# Patient Record
Sex: MALE | Race: BLACK OR AFRICAN AMERICAN | NOT HISPANIC OR LATINO | Employment: UNEMPLOYED | URBAN - METROPOLITAN AREA
[De-identification: names, ages, dates, MRNs, and addresses within clinical notes are randomized per-mention and may not be internally consistent; named-entity substitution may affect disease eponyms.]

---

## 2023-11-08 ENCOUNTER — APPOINTMENT (OUTPATIENT)
Dept: RADIOLOGY | Facility: CLINIC | Age: 23
End: 2023-11-08
Attending: EMERGENCY MEDICINE
Payer: COMMERCIAL

## 2023-11-08 ENCOUNTER — HOSPITAL ENCOUNTER (EMERGENCY)
Facility: CLINIC | Age: 23
Discharge: HOME | End: 2023-11-08
Attending: EMERGENCY MEDICINE | Admitting: EMERGENCY MEDICINE
Payer: COMMERCIAL

## 2023-11-08 VITALS
HEART RATE: 87 BPM | SYSTOLIC BLOOD PRESSURE: 131 MMHG | HEIGHT: 71 IN | OXYGEN SATURATION: 98 % | RESPIRATION RATE: 18 BRPM | BODY MASS INDEX: 22.26 KG/M2 | DIASTOLIC BLOOD PRESSURE: 74 MMHG | TEMPERATURE: 98.7 F | WEIGHT: 159 LBS

## 2023-11-08 DIAGNOSIS — R07.89 RIGHT-SIDED CHEST WALL PAIN: ICD-10-CM

## 2023-11-08 PROCEDURE — 71046 X-RAY EXAM CHEST 2 VIEWS: CPT

## 2023-11-08 PROCEDURE — 99283 EMERGENCY DEPT VISIT LOW MDM: CPT | Mod: 25

## 2023-11-08 PROCEDURE — 250N000013 HC RX MED GY IP 250 OP 250 PS 637: Performed by: EMERGENCY MEDICINE

## 2023-11-08 RX ORDER — IBUPROFEN 800 MG/1
800 TABLET, FILM COATED ORAL EVERY 8 HOURS PRN
Qty: 20 TABLET | Refills: 0 | Status: SHIPPED | OUTPATIENT
Start: 2023-11-08 | End: 2023-11-16

## 2023-11-08 RX ORDER — IBUPROFEN 600 MG/1
600 TABLET, FILM COATED ORAL ONCE
Status: COMPLETED | OUTPATIENT
Start: 2023-11-08 | End: 2023-11-08

## 2023-11-08 RX ORDER — METHOCARBAMOL 500 MG/1
500 TABLET, FILM COATED ORAL 3 TIMES DAILY PRN
Qty: 20 TABLET | Refills: 0 | Status: SHIPPED | OUTPATIENT
Start: 2023-11-08

## 2023-11-08 RX ADMIN — IBUPROFEN 600 MG: 600 TABLET ORAL at 20:06

## 2023-11-09 NOTE — ED PROVIDER NOTES
EMERGENCY DEPARTMENT ENCOUNTER      NAME: Mojgan Nielsen  AGE: 22 year old male  YOB: 2000  MRN: [id number]  EVALUATION DATE & TIME: No admission date for patient encounter.    PCP: No Ref-Primary, Physician    ED PROVIDER: Yovana Canales DO      Chief Complaint   Patient presents with    Chest Wall Pain    Back Pain         FINAL IMPRESSION:  1. Right-sided chest wall pain          ED COURSE & MEDICAL DECISION MAKIN-year-old male presented to the ED for evaluation of right sided chest wall and back pain that been ongoing for the last 1 to 2 days and are worsened with any movement of his torso.  The patient had not taken anything for his symptoms prior to ED arrival.  The patient was hemodynamically stable here in the ED.  He did not appear to be in any obvious distress or discomfort at the time of his initial evaluation.  On exam the patient's pain was clearly reproducible to palpation over the anterolateral chest wall and upper back on the right side.  The remainder of his physical exam was unremarkable.    The patient was given a dose of ibuprofen to treat his symptoms prior to my evaluation.  Patient notes that the pain had improved with the ibuprofen.  The chest x-ray was nondiagnostic.    The patient was informed of the chest x-ray results and reassured.  The patient was informed that his symptoms likely represent a musculoskeletal etiology such as a muscle strain and/or spasm.  After educating and reassure the patient he felt comfortable returning home.  The patient was sent home with ibuprofen and Robaxin to use as needed.  The patient was instructed to follow-up with his primary care provider for reevaluation or to return back to ED sooner for any worsening pain or other new or concerning symptoms.    Pertinent Labs & Imaging studies reviewed. (See chart for details)  8:45 PM I met with the patient to gather history and to perform my initial exam. We discussed plans for the ED course,  including diagnostic testing and treatment. We discussed the plan for discharge and the patient is agreeable. Reviewed supportive cares, symptomatic treatment, outpatient follow up, and reasons to return to the Emergency Department. Patient to be discharged by ED RN.        At the conclusion of the encounter I discussed the results of all of the tests and the disposition. The questions were answered. The patient or family acknowledged understanding and was agreeable with the care plan.     Medical Decision Making    History:  Supplemental history from: N/A  External Record(s) reviewed: Documented in chart, if applicable.    Work Up:  Chart documentation includes differential considered and any EKGs or imaging independently interpreted by provider, where specified.  In additional to work up documented, I considered the following work up: Documented in chart, if applicable.    External consultation:  Discussion of management with another provider: Documented in chart, if applicable    Complicating factors:  Care impacted by chronic illness: N/A  Care affected by social determinants of health: N/A    Disposition considerations: Discharge. I prescribed additional prescription strength medication(s) as charted. See documentation for any additional details.    PPE worn: n95 mask, goggles    MEDICATIONS GIVEN IN THE EMERGENCY:  Medications   ibuprofen (ADVIL/MOTRIN) tablet 600 mg (600 mg Oral $Given 11/8/23 2006)       NEW PRESCRIPTIONS STARTED AT TODAY'S ER VISIT  Discharge Medication List as of 11/8/2023  8:55 PM        START taking these medications    Details   ibuprofen (ADVIL/MOTRIN) 800 MG tablet Take 1 tablet (800 mg) by mouth every 8 hours as needed for moderate pain, Disp-20 tablet, R-0, Local Print      methocarbamol (ROBAXIN) 500 MG tablet Take 1 tablet (500 mg) by mouth 3 times daily as needed for muscle spasms, Disp-20 tablet, R-0, Local Print             "    =================================================================    HPI    Patient information was obtained from: Patient    Use of : N/A        Mojgan Nielsen is a 22 year old male with no pertinent history who presents to this ED via walk-in for evaluation of chest and back pain.    Patient reports right chest and back of right shoulder pain that started when he woke up this morning. He notes reaching into a fridge at 3 PM yesterday. Patient says pain is \"like something is hitting me inside my chest\" and it worsens with deep breaths and when reaching down. Otherwise, patient denies any trauma or injury to pain area, or taking any pain medications before coming to ED. No other complaints at this time.       REVIEW OF SYSTEMS   Review of Systems   Cardiovascular:  Positive for chest pain (right).   Musculoskeletal:         Positive for pain to back of right shoulder   All other systems reviewed and are negative.       PAST MEDICAL HISTORY:  No past medical history on file.    PAST SURGICAL HISTORY:  No past surgical history on file.        CURRENT MEDICATIONS:    ibuprofen (ADVIL/MOTRIN) 800 MG tablet  methocarbamol (ROBAXIN) 500 MG tablet        ALLERGIES:  No Known Allergies    FAMILY HISTORY:  No family history on file.    SOCIAL HISTORY:   Social History     Socioeconomic History    Marital status: Single       VITALS:  /74   Pulse 87   Temp 98.7  F (37.1  C) (Temporal)   Resp 18   Ht 1.803 m (5' 11\")   Wt 72.1 kg (159 lb)   SpO2 98%   BMI 22.18 kg/m      PHYSICAL EXAM    General presentation: Alert, Vital signs reviewed. NAD  HENT: ENT inspection is normal. Oropharynx is moist and clear.   Eye: Pupils are equal and reactive to light. EOMI  Neck: The neck is supple, with full ROM, with no evidence of meningismus.  Pulmonary: Currently in no acute respiratory distress. Normal, non labored respirations, the lung sounds are normal with good equal air movement. Clear to auscultation " bilaterally.   Circulatory: Regular rate and rhythm. Peripheral pulses are strong and equal. No murmurs, rubs, or gallops.   Abdominal: The abdomen is soft. Nontender. No rigidity, guarding, or rebound. Bowel sounds normal.   Neurologic: Alert, oriented to person, place, and time. No motor deficit. No sensory deficit. Cranial nerves II through XII are intact.  Musculoskeletal: No extremity tenderness. Full range of motion in all extremities. No extremity edema. Palpation to right chest wall and right upper back reproduces pain.  Skin: Skin color is normal. No rash. Warm. Dry to touch.      LAB:  All pertinent labs reviewed and interpreted.  Results for orders placed or performed during the hospital encounter of 11/08/23   Chest XR,  PA & LAT    Impression    IMPRESSION: Negative chest.       RADIOLOGY:  Reviewed all pertinent imaging. Please see official radiology report.  Chest XR,  PA & LAT   Final Result   IMPRESSION: Negative chest.          EKG:    None    PROCEDURES:   None      [facility] Documentation:   CMS Diagnoses:               I, Yuki Bobo , am serving as a scribe to document services personally performed by Yovana Canales DO based on my observation and the provider's statements to me. I, Yovana Canales, attest that Yuki Bobo is acting in a scribe capacity, has observed my performance of the services and has documented them in accordance with my direction.    Yovana Canales DO  Emergency Medicine  [facility] EMERGENCY ROOM  [address] [id number] [phone]       Yovana Canales DO  11/09/23 0001

## 2023-11-09 NOTE — DISCHARGE INSTRUCTIONS
Your pain is likely due to muscle spasms.  The chest x-ray appears reassuring here today in the ED.    Take the prescribed ibuprofen and the muscle relaxant as needed for any further pain.      Follow-up with your primary care provider for reevaluation or return back to ED sooner for any worsening pain or any other new or concerning symptoms.

## 2023-11-09 NOTE — ED TRIAGE NOTES
Pt presents to the ED with c/o of right chest wall pain that radiates to the back. Pt reports waking up today and feeling the pain with movement and when he breathes in. Pt reports very painful when bending over to pray.      Triage Assessment (Adult)       Row Name 11/08/23 1501          Triage Assessment    Airway WDL WDL        Respiratory WDL    Respiratory WDL WDL        Skin Circulation/Temperature WDL    Skin Circulation/Temperature WDL WDL        Cardiac WDL    Cardiac WDL WDL        Peripheral/Neurovascular WDL    Peripheral Neurovascular WDL WDL        Cognitive/Neuro/Behavioral WDL    Cognitive/Neuro/Behavioral WDL WDL

## 2025-07-29 ENCOUNTER — OFFICE VISIT (OUTPATIENT)
Dept: FAMILY MEDICINE | Facility: CLINIC | Age: 25
End: 2025-07-29
Payer: COMMERCIAL

## 2025-07-29 VITALS
WEIGHT: 144.1 LBS | OXYGEN SATURATION: 98 % | DIASTOLIC BLOOD PRESSURE: 70 MMHG | RESPIRATION RATE: 16 BRPM | BODY MASS INDEX: 21.34 KG/M2 | SYSTOLIC BLOOD PRESSURE: 114 MMHG | TEMPERATURE: 97.7 F | HEIGHT: 69 IN | HEART RATE: 97 BPM

## 2025-07-29 DIAGNOSIS — Z11.4 SCREENING FOR HIV (HUMAN IMMUNODEFICIENCY VIRUS): ICD-10-CM

## 2025-07-29 DIAGNOSIS — N50.89 MASS OF RIGHT TESTICLE: ICD-10-CM

## 2025-07-29 DIAGNOSIS — Z11.59 NEED FOR HEPATITIS C SCREENING TEST: ICD-10-CM

## 2025-07-29 DIAGNOSIS — Z13.6 SCREENING FOR CARDIOVASCULAR CONDITION: ICD-10-CM

## 2025-07-29 DIAGNOSIS — N50.811 TESTICULAR PAIN, RIGHT: ICD-10-CM

## 2025-07-29 DIAGNOSIS — E55.9 VITAMIN D DEFICIENCY: ICD-10-CM

## 2025-07-29 DIAGNOSIS — Z11.3 SCREENING FOR STDS (SEXUALLY TRANSMITTED DISEASES): ICD-10-CM

## 2025-07-29 DIAGNOSIS — R73.9 HYPERGLYCEMIA: ICD-10-CM

## 2025-07-29 DIAGNOSIS — Z00.00 ROUTINE GENERAL MEDICAL EXAMINATION AT A HEALTH CARE FACILITY: Primary | ICD-10-CM

## 2025-07-29 LAB
ERYTHROCYTE [DISTWIDTH] IN BLOOD BY AUTOMATED COUNT: 12.7 % (ref 10–15)
HCT VFR BLD AUTO: 46.4 % (ref 40–53)
HGB BLD-MCNC: 15.9 G/DL (ref 13.3–17.7)
HOLD SPECIMEN: NORMAL
LDH SERPL L TO P-CCNC: 187 U/L (ref 0–250)
MCH RBC QN AUTO: 30.7 PG (ref 26.5–33)
MCHC RBC AUTO-ENTMCNC: 34.3 G/DL (ref 31.5–36.5)
MCV RBC AUTO: 90 FL (ref 78–100)
PLATELET # BLD AUTO: 204 10E3/UL (ref 150–450)
RBC # BLD AUTO: 5.18 10E6/UL (ref 4.4–5.9)
WBC # BLD AUTO: 4.2 10E3/UL (ref 4–11)

## 2025-07-29 PROCEDURE — 85027 COMPLETE CBC AUTOMATED: CPT

## 2025-07-29 PROCEDURE — 83615 LACTATE (LD) (LDH) ENZYME: CPT

## 2025-07-29 PROCEDURE — G2211 COMPLEX E/M VISIT ADD ON: HCPCS

## 2025-07-29 PROCEDURE — 83036 HEMOGLOBIN GLYCOSYLATED A1C: CPT

## 2025-07-29 PROCEDURE — 86803 HEPATITIS C AB TEST: CPT

## 2025-07-29 PROCEDURE — 82306 VITAMIN D 25 HYDROXY: CPT

## 2025-07-29 PROCEDURE — 82105 ALPHA-FETOPROTEIN SERUM: CPT

## 2025-07-29 PROCEDURE — 87491 CHLMYD TRACH DNA AMP PROBE: CPT

## 2025-07-29 PROCEDURE — 36415 COLL VENOUS BLD VENIPUNCTURE: CPT

## 2025-07-29 PROCEDURE — 87389 HIV-1 AG W/HIV-1&-2 AB AG IA: CPT

## 2025-07-29 PROCEDURE — 99385 PREV VISIT NEW AGE 18-39: CPT

## 2025-07-29 PROCEDURE — 84702 CHORIONIC GONADOTROPIN TEST: CPT

## 2025-07-29 PROCEDURE — 80048 BASIC METABOLIC PNL TOTAL CA: CPT

## 2025-07-29 PROCEDURE — 99213 OFFICE O/P EST LOW 20 MIN: CPT | Mod: 25

## 2025-07-29 PROCEDURE — 87591 N.GONORRHOEAE DNA AMP PROB: CPT

## 2025-07-29 PROCEDURE — 80061 LIPID PANEL: CPT

## 2025-07-29 SDOH — HEALTH STABILITY: PHYSICAL HEALTH: ON AVERAGE, HOW MANY DAYS PER WEEK DO YOU ENGAGE IN MODERATE TO STRENUOUS EXERCISE (LIKE A BRISK WALK)?: 2 DAYS

## 2025-07-29 ASSESSMENT — SOCIAL DETERMINANTS OF HEALTH (SDOH): HOW OFTEN DO YOU GET TOGETHER WITH FRIENDS OR RELATIVES?: ONCE A WEEK

## 2025-07-29 NOTE — PATIENT INSTRUCTIONS
Thank you for seeing us at [facility].    You will get a call to schedule the Ultraosund of your testicles     To Review:  If you are getting lab work today, we will send you a BringMeTheNews message with recommendations once these are all returned to us.  X-rays are able to be performed in clinic and we will notify you of the results.  Any other imaging is scheduled and you will be contacted by the scheduling department.  If you do not hear from them in 2 weeks, please call [phone]   If you are getting immunizations today, you may have some arm soreness; you can use tylenol or ibuprofen for this.  If you are getting referrals you should be called within the next 3 to 5 days.    An E visit is an excellent way to get quick evaluation from myself. These can be completed using the [facility] Kisha or online using BringMeTheNews. We can evaluate a variety of conditions using this including sinusitis, skin conditions, etc. Please send us a BringMeTheNews Message or call if having issues or questions.    Dr. CORY Williamson DO, MS  [facility] Medicine  [phone]   Patient Education   Talada Daryeelka Ka   Hortaga ah  Gigi waa franciscoo courtney oo aan inta badan bixino si aan dadka uga caawino inay caafimaad qabaan. Kooxdaada daryeelka ayaa laga yaabaa inay kuu hayaan belinda branhamu gaar ah. Fadlan todd hadal kooxdaada daryeelka baahiyahaaga daryeelka ee ka hortaga.  Hab Nololeedka  Samee jimvenusi ugu yaraan 150 daqiiqo todobaad kasta (30 daqiiqo maalintii, 5 maalmood todobaadkii).  Samee hawlaha xoojiya murqaha 2 maalmood todobaadkii. Kuwani waxay kaa caawinayaan xakamaynta miisaankaaga iyo ka hortaga cudurada.  Lama ogola sigaar cabista  Xiro muraayadaha qorraxda celiya si aad uga hortagto kansarka maqaarka.  Wakhti la qaado qoyska iyo saaxiibada.  Gurigaaga phipps laga dinah gaaska radon 2 ilaa 5-tii sanaba mar. Radon waa gaas aan midab srinath, oo aan ur srinath oo wax gabrielle terrance sambabadaada. Si aad meri badlucina uga  "ogaato, aad [email or web address] oo raadi \"Radon in Homes.\"  Hay qoryaha iyagoo aan rasaas ku jirin oo ku xir goob badqab leh sida khasnadda badqab leh ama isticmaal khaanada qoryaha, oo qari furayaasha. Markasta si gooni ah ugu quful khaanad rasaasta. Si aad wax badan uga ogaato, booqo dps.mn.gov oo raadi \"safe gun storage.\"  Nafaqada  Cun 5 cunto ama ka badan oo khudaar iyo bushra ah maalin kasta.  Isku day rootiga qamadiga ka samaysan, bariiska buniga ah iyo baastada (badalkii rootiga cad, bariiska, iyo baasto).  Hel calcium iyo vitamin D kugu filan. Hubi calaamadda cuntooyinka oo ujeedo 100% ee RDA (kaalmada maalinlaha ah ee lagu taliyay).  Baaritaano joogta ah  Samee baaritaanka ilkaha iyo nadiifinta 6 bilood kasta.  Dadka waawayn: Ka ogoow kooxdaada daryeelka inta jeer ee ay tahay inaad sameyso baaritaanka xusuusta.  Arag kooxdaada daryeelka caafimaadka sanad kasta si aad ugala hadasho:  Isbadal kasta oo ku yimaadda caafimaadkaaga.  Daawooyin kasta oo kooxdaada daryeelka kuu qoreen.  Daryeelka ka hortagga, qorshaynta dhalmada qoyska, iyo siyaabaha looga hortago cudurada daba dheeraada.  Talaalada (talaalo)   Talaalka HPV (ilaa da'da 26), haddii aadan waligaa hore u qaadanin.  Talaalka cagaarshawga B (ilaa da'da 59),haddi aanad hore u qaadanin.  [facility] COVID-19: Qaado tahmina oliverosmaato.  [facility] dinoraB[facility]: qaado tahmina alston sannaamelia kasta.  BearIntermountain Healthcarejatin JensenAtrium Health Wake [facility] Baptist Wilkes Medical Centerdada: Qaado 10-ki sanaba mar.  Tahmina maradiaga, omer A, anuj Lundbergjatin RSV: Weydii kooxdaada daryeelka haddii aad u baahan tahay kuwaas oo ku salaysan khatarta aad ku jirto.  Rejijatin maldonado (loogu talagaly da'da 50 iyo jatin jack).  Shadiaritaanada guWestern [facility]  Baaritaanka soLongwood [facility]:  Laga bilaabo da'da 35, Isjatin zaldivar sokorowga ugu yaraan 3 sanaba mar.  Haddii aad ka chas tahay da'da 35, waydii kooxdaada daryeelka haddii ay tahay in [facility].  Baaritaanka EstefaníaKaiser Sunnyside Medical Centerolka: Da'da 39, bilow inaad iska baPrenticesarthak vazMountain View Regional Hospital - Casperolka 5 " sanaba mar, ama marar badan haddii lagu taliyey.  Baaritaanka [facility]unaLev[facility] Children's Hospital Lafta (DEXA): Da'da 50,Waydii kooxdaada daryeelka haddii ay tahay in [facility] Hospitalo baaritaanka jiLong [facility] Memorial Medical Center).  Cagaarshowga C: Iska baar ugu yaraan geraldo mar noloshaada.  Baaritaanka god ku yaala Xididka Dhiiga ka Qaada Wadnaha: Todd hadal dhakhtarkaaga baaritaankan haddii aad:  Weligaa Sigaar ma cabtay; oo  Aadiga ma lab tahay; oo  da'da u dhaxayso 65 iyo 75.  Cudurada galmada [facility] todd qaado (STIs)  Kahor da'da 24:  Weydii kooxdaada daryeelka haddii ay tahay in [facility] todd qaado (STIs).  Kadib da'da 24: Iska baar Cudurada galmada [facility] todd qaado (STIs) haddii aad halis ugu jirto. Aad halis ugu jirto [facility] Hospitalrada galmada [facility] todd qaado (STIs) (oo ay ku jiraan HIV) haddii:  Hadii aad galmo la samaysay geraldo qof ka badan.  Aadan isticmaalin cinjirka galmada [facility] Systemcindy [facility] Sunsetjanice.  Adiga ama lamaanahaaga laga helay cudur [facility] todd qaado [facility].  Hadii aad halis ugu jirto HIV, wax ka waydii daawada PrEP si aad uga hortagto HIV.  Iska baar HIV ugu yaraan geraldo mar noloshaada, haddii aad halis ugu jirto HIV iyo haddii kale.  Baaritaanada Kansarka  Baaritaanada Kansarka ee Xubinta Taranka haweenka: Haddii aad dumar tahay, bilow inaad si joogto ah u hesho baaritaanka [facility] qeybta hore ee ilmo xubinta taranka da'da 21. Inta badan dadka sameeya baaritaanada caadiga ah ee leh natiijooyinka caadiga ah waxay joogsan karaan da'da 65 kadib. Midan todd hadal [facility].  Baaritaanka [facility] (baaritaanka sawiritaanka ee [facility]): Haddii aad naaso leedahay, bilaw inaad ka baarto naahaaga [facility] si joogto ahda'da 40. Kani waa baaritalucina huggins oo lagu baaro [facility].  Baaritaanka [facility] xiid-maha waaweydarren: Waa muhiim in la bilaabo [facility] danielle da'da 45.  Samee baaritaanka [facility] malawadka 10-ki sanaba mar (ama in ka badan haddii aad halis ugu jirto) [place], weydii bixiyahaaga  baaritaanada saxarada sida imtixaanka FIT sanad walba ama baaritaanka Cologuard 3-dii sanaba mar.  Si aad wax badan uga barato ikhtiyaaradaada micheal dunnqo: [email or web address].  Caawimaada go'aan micheal beaversqo: bit.ly/ok13128.  BaBon Secours Health SystemtaNemaha Valley Community Hospitalka raga: Hadii aad isadora tahay aad jirto da'da 55 ilaa 69, ka codso daryeel bixiyahaaga haddii aad ka faa'iidaysan lahayd baaritaanka [facility] Centeradi [facility] Hospitalka ee sannadkiiba mar.  Baaritaanka [facility] Centerbada: Hadii aad hada sigaar cabto ama aad horaan u cabaysay oo aad jirto da'da 50 ilaa 80, ka codso kooxdaada daryeelka haddii baaritaanka [facility] Care Centerbada socda uu kugu habboon yahay.    Ujeeddooyin wargelin oo kaliya. Ma aha inay beddel u noqoto talada [facility] Centerjatinaga. Xuquuqda daabacaadda   2023 [facility] Services. [address]bury Hospitallucina xuquuqdu way dhowrsan tahay. Waxaa caafimaad ahaan trevon u eegay M [facility] [employer] [id number] - REV 06/25.

## 2025-07-29 NOTE — PROGRESS NOTES
Preventive Care Visit  [facility]  Jayy Williamson DO, Family Medicine  Jul 29, 2025      Assessment & Plan     Routine general medical examination at a health care facility    24-year-old male here for physical and acute conditions as below  Lives with: cousin, sister in [place]  Work: just started working, warehouse job  Hobbies:  play soccer, go to gym  Diet: no restrictions   Physical Activity: going to the gym 2x/week 60-90 minutes each time  Sleep: getting about 7 hours  Should be getting 5 servings of fruits and vegetables per day, 3 servings of calcium, and 150 minutes of strenuous activity per week  Red flags to speak to a healthcare provider about would be lumps or bumps in your genitals or breast tissue, blood in your urine, blood in your semen, losing weight without trying, waking up drenched in sweat    Follow-up in 1 year or sooner if you have e-visits or a clinical appointment for acute complaint    - REVIEW OF HEALTH MAINTENANCE PROTOCOL ORDERS    Screening for HIV (human immunodeficiency virus)    - HIV Antigen Antibody Combo; Future  - HIV Antigen Antibody Combo  - Extra Green Top Tube (LAB USE ONLY)    Need for hepatitis C screening test    - Hepatitis C antibody; Future  - Hepatitis C antibody    Screening for cardiovascular condition    - Lipid panel reflex to direct LDL Non-fasting; Future  - Lipid panel reflex to direct LDL Non-fasting    Screening for STDs (sexually transmitted diseases)    - Chlamydia trachomatis/Neisseria gonorrhoeae by PCR- URINE; Future  - Chlamydia trachomatis/Neisseria gonorrhoeae by PCR- URINE    Testicular pain, right  Mass of right testicle    Noticed an enlargement of his right testicle approximately 5 months ago in February 2025  It is slowly gotten larger over time  There is minimal pain associated with this  He says that when it is hot it is not cause any problems minutes cold he can cause pain he is it begins to reduce in size  He has not  noticed any hematuria or blood in his ejaculate  He has lost approximately 15 pounds over the last 20 months  Not waking up with any night sweats  Most recent sexual activity was approximately 2 years ago and he used a condom  On physical exam right testicles approximately 2-3 times the size of the left  No apparent hernia or varicocele  Minimal tenderness to palpation  Left testicle is normal  I have concern due to the patient's age of testicular cancer.  Will look at tumor markers as well as get an ultrasound of the scrotum and place if appropriate referral    - US Testicular & Scrotum w Doppler Ltd; Future  - Lactate Dehydrogenase  - HCG tumor marker  - AFP tumor marker  - CBC with platelets  - Basic metabolic panel  (Ca, Cl, CO2, Creat, Gluc, K, Na, BUN)    Vitamin D deficiency    - Vitamin D Deficiency; Future      Patient has been advised of split billing requirements and indicates understanding: Yes    Counseling  Appropriate preventive services were addressed with this patient via screening, questionnaire, or discussion as appropriate for fall prevention, nutrition, physical activity, Tobacco-use cessation, social engagement, weight loss and cognition.  Checklist reviewing preventive services available has been given to the patient.  He is at risk for lack of exercise and has been provided with information to increase physical activity for the benefit of his well-being.   The patient was instructed to see the dentist every 6 months.   Reviewed preventive health counseling, as reflected in patient instructions       Regular exercise       Healthy diet/nutrition       Safe sex practices/STD prevention    The longitudinal plan of care for the diagnosis(es)/condition(s) as documented were addressed during this visit. Due to the added complexity in care, I will continue to support Candy in the subsequent management and with ongoing continuity of care.    The use of Dragon/PowerMic dictation services may have  been used to construct the content in this note; any grammatical or spelling errors are non-intentional. Please contact the author of this note directly if you are in need of any clarification.     The purpose of this documentation is for billing as well providing written record for future healthcare providers. Medical vocabulary, knowledge, and acornyms are utilized for those providers.    Kannan Ulrich is a 24 year old, presenting for the following:  Physical (Testicular pain, when feeling cold, testicles tighten and feel pain x mid feb 25'. )        7/29/2025    12:48 PM   Additional Questions   Roomed by JHOANA Shah   Accompanied by SELF          Healthy Habits:     Taking medications regularly:  0  History of Present Illness       Reason for visit:  I found enlargement on my rigt testical   He is taking medications regularly.    Concerns:    Testicular Concern  DURATION: about 5 months; February 2025  DESCRIPTION: when exercising like running or doing squats will notice changes to his right testicle.   Reports When he touches the testicle, he feels like there is some sort of blood clot on the top of the right testicle  There is pain when he is feeling cold and it also gets smaller  Mostly at night but occasionally during the day  Weight went from 159 to 144 between 11/8/2023 and today    No problems with the left testicle    No blood in urine  No blood in ejaculate  Occasionally feels warm  Weight loss as above  No night sweats  No history of testicular problems in his past.  .  Sexually active with women  No sexual activity since 2023  Reports using condoms when he was sexually active in the past.   Circumcised        PMH  There are no active problems to display for this patient.      Meds  No current outpatient medications on file.     PSH  History reviewed. No pertinent surgical history.    Lives with: cousin, sister in [place]  Work: just started working, [employer] job  Hobbies:  play soccer, go to  gym  Diet: no restrictions   Physical Activity: going to the gym 2x/week 60-90 minutes each time  Sleep: getting about 7 hours    GENERAL: No fever, chills  HEENT: Intermittent headache. No changes in vision, hearing, nasal congestion, dental pain, neck pain, sore throat.  Dentist:  never  Eye Doctor: no glasses or contacts  CARDIAC: Denies chest pain or shortness of breath  LUNG: Denies dyspnea on exertion or chest pain  ABD: Denies pain, nausea, vomiting, diarrhea, constipation  : see above  SKIN: Denies new rashes  NEURO: No numbness,  tingling   MSK: occasional weakness  PSYCH: No changes in mood, no HI or SI        Advance Care Planning    Discussed advance care planning with patient; however, patient declined at this time.        7/29/2025   General Health   How would you rate your overall physical health? Excellent   Feel stress (tense, anxious, or unable to sleep) Only a little   (!) STRESS CONCERN       No data to display                  7/29/2025   Exercise   Days per week of moderate/strenous exercise 2 days   (!) EXERCISE CONCERN      7/29/2025   Social Factors   Frequency of gathering with friends or relatives Once a week   Worry food won't last until get money to buy more No   Food not last or not have enough money for food? No   Do you have housing? (Housing is defined as stable permanent housing and does not include staying outside in a car, in a tent, in an abandoned building, in an overnight shelter, or couch-surfing.) Yes   Are you worried about losing your housing? No   Lack of transportation? No   Unable to get utilities (heat,electricity)? No         7/29/2025   Dental   Dentist two times every year? (!) NO         Today's PHQ-2 Score:       7/29/2025    12:38 PM   PHQ-2 ( 1999 [employer])   Q1: Little interest or pleasure in doing things 0   Q2: Feeling down, depressed or hopeless 0   PHQ-2 Score 0    Q1: Little interest or pleasure in doing things Not at all   Q2: Feeling down, depressed or  "hopeless Not at all   PHQ-2 Score 0       Patient-reported           7/29/2025   Substance Use   Alcohol more than 3/day or more than 7/wk No   Do you use any other substances recreationally? No     Social History     Tobacco Use    Smoking status: Never     Passive exposure: Never    Smokeless tobacco: Never   Vaping Use    Vaping status: Never Used             7/29/2025   One time HIV Screening   Previous HIV test? No         7/29/2025   STI Screening   New sexual partner(s) since last STI/HIV test? No         7/29/2025   Contraception/Family Planning   Questions about contraception or family planning No        Reviewed and updated as needed this visit by Provider        Surg Hx              Objective    Exam  /70   Pulse 97   Temp 97.7  F (36.5  C) (Oral)   Resp 16   Ht 1.765 m (5' 9.49\")   Wt 65.4 kg (144 lb 1.6 oz)   SpO2 98%   BMI 20.98 kg/m     Estimated body mass index is 20.98 kg/m  as calculated from the following:    Height as of this encounter: 1.765 m (5' 9.49\").    Weight as of this encounter: 65.4 kg (144 lb 1.6 oz).    Physical Exam  Vitals reviewed.   Constitutional:       Appearance: Normal appearance.   HENT:      Head: Normocephalic and atraumatic.      Right Ear: Tympanic membrane, ear canal and external ear normal.      Left Ear: Tympanic membrane, ear canal and external ear normal.      Nose: Nose normal.      Mouth/Throat:      Mouth: Mucous membranes are moist.   Eyes:      Extraocular Movements: Extraocular movements intact.      Pupils: Pupils are equal, round, and reactive to light.   Cardiovascular:      Rate and Rhythm: Normal rate and regular rhythm.      Heart sounds: Normal heart sounds.   Pulmonary:      Effort: Pulmonary effort is normal.      Breath sounds: Normal breath sounds.   Abdominal:      General: Abdomen is flat. Bowel sounds are normal.      Palpations: Abdomen is soft.   Genitourinary:     Testes:         Right: Mass and swelling present.         Left: " Mass not present.      Comments: Uniform enlargement of the right testicle that is not irregular.  Approximately 2-3 times larger of the left.  No apparent varicocele  Musculoskeletal:      Cervical back: Normal range of motion and neck supple.      Comments: Appropriate strength in tested fields   Skin:     General: Skin is warm and dry.   Neurological:      General: No focal deficit present.      Mental Status: He is alert.   Psychiatric:         Mood and Affect: Mood normal.         Behavior: Behavior normal.       Signed Electronically by: Jayy Williamson DO

## 2025-07-30 ENCOUNTER — HOSPITAL ENCOUNTER (OUTPATIENT)
Dept: ULTRASOUND IMAGING | Facility: CLINIC | Age: 25
Discharge: HOME | End: 2025-07-30
Payer: COMMERCIAL

## 2025-07-30 DIAGNOSIS — N50.811 TESTICULAR PAIN, RIGHT: ICD-10-CM

## 2025-07-30 DIAGNOSIS — N50.89 MASS OF RIGHT TESTICLE: ICD-10-CM

## 2025-07-30 LAB
AFP SERPL-MCNC: 2.2 NG/ML
ANION GAP SERPL CALCULATED.3IONS-SCNC: 10 MMOL/L (ref 7–15)
BUN SERPL-MCNC: 10.7 MG/DL (ref 6–20)
C TRACH DNA SPEC QL PROBE+SIG AMP: NEGATIVE
CALCIUM SERPL-MCNC: 9.8 MG/DL (ref 8.8–10.4)
CHLORIDE SERPL-SCNC: 102 MMOL/L (ref 98–107)
CHOLEST SERPL-MCNC: 158 MG/DL
CREAT SERPL-MCNC: 0.89 MG/DL (ref 0.67–1.17)
EGFRCR SERPLBLD CKD-EPI 2021: >90 ML/MIN/1.73M2
FASTING STATUS PATIENT QL REPORTED: NO
FASTING STATUS PATIENT QL REPORTED: NO
GLUCOSE SERPL-MCNC: 130 MG/DL (ref 70–99)
HCG-TM SERPL-ACNC: <3 IU/L
HCO3 SERPL-SCNC: 26 MMOL/L (ref 22–29)
HCV AB SERPL QL IA: NONREACTIVE
HDLC SERPL-MCNC: 54 MG/DL
HIV 1+2 AB+HIV1 P24 AG SERPL QL IA: NONREACTIVE
LDLC SERPL CALC-MCNC: 95 MG/DL
N GONORRHOEA DNA SPEC QL NAA+PROBE: NEGATIVE
NONHDLC SERPL-MCNC: 104 MG/DL
POTASSIUM SERPL-SCNC: 3.9 MMOL/L (ref 3.4–5.3)
SODIUM SERPL-SCNC: 138 MMOL/L (ref 135–145)
SPECIMEN TYPE: NORMAL
TRIGL SERPL-MCNC: 45 MG/DL
VIT D+METAB SERPL-MCNC: 21 NG/ML (ref 20–50)

## 2025-07-30 PROCEDURE — 76870 US EXAM SCROTUM: CPT

## 2025-07-31 ENCOUNTER — RESULTS FOLLOW-UP (OUTPATIENT)
Dept: FAMILY MEDICINE | Facility: CLINIC | Age: 25
End: 2025-07-31
Payer: COMMERCIAL

## 2025-07-31 DIAGNOSIS — N43.3 HYDROCELE, RIGHT: ICD-10-CM

## 2025-07-31 DIAGNOSIS — R73.9 HYPERGLYCEMIA: Primary | ICD-10-CM

## 2025-07-31 LAB
EST. AVERAGE GLUCOSE BLD GHB EST-MCNC: 114 MG/DL
HBA1C MFR BLD: 5.6 % (ref 0–5.6)

## 2025-08-05 ENCOUNTER — OFFICE VISIT (OUTPATIENT)
Dept: FAMILY MEDICINE | Facility: CLINIC | Age: 25
End: 2025-08-05
Payer: COMMERCIAL

## 2025-08-05 VITALS
WEIGHT: 146 LBS | SYSTOLIC BLOOD PRESSURE: 112 MMHG | HEART RATE: 88 BPM | RESPIRATION RATE: 16 BRPM | BODY MASS INDEX: 21.26 KG/M2 | DIASTOLIC BLOOD PRESSURE: 70 MMHG | OXYGEN SATURATION: 98 %

## 2025-08-05 DIAGNOSIS — N43.3 RIGHT HYDROCELE: Primary | ICD-10-CM

## 2025-08-05 DIAGNOSIS — I86.1 LEFT VARICOCELE: ICD-10-CM

## 2025-08-05 PROCEDURE — 99214 OFFICE O/P EST MOD 30 MIN: CPT

## 2025-08-05 PROCEDURE — G2211 COMPLEX E/M VISIT ADD ON: HCPCS

## 2025-08-18 ENCOUNTER — OFFICE VISIT (OUTPATIENT)
Dept: UROLOGY | Facility: CLINIC | Age: 25
End: 2025-08-18
Payer: COMMERCIAL

## 2025-08-18 VITALS
HEART RATE: 57 BPM | BODY MASS INDEX: 20.9 KG/M2 | SYSTOLIC BLOOD PRESSURE: 117 MMHG | WEIGHT: 146 LBS | HEIGHT: 70 IN | OXYGEN SATURATION: 100 % | DIASTOLIC BLOOD PRESSURE: 78 MMHG

## 2025-08-18 DIAGNOSIS — N50.82 SCROTAL PAIN: Primary | ICD-10-CM

## 2025-08-18 DIAGNOSIS — N43.3 HYDROCELE, RIGHT: ICD-10-CM

## 2025-08-18 PROCEDURE — 99204 OFFICE O/P NEW MOD 45 MIN: CPT | Performed by: STUDENT IN AN ORGANIZED HEALTH CARE EDUCATION/TRAINING PROGRAM

## 2025-08-18 RX ORDER — DOXYCYCLINE HYCLATE 100 MG
100 TABLET ORAL 2 TIMES DAILY
Qty: 42 TABLET | Refills: 0 | Status: SHIPPED | OUTPATIENT
Start: 2025-08-18 | End: 2025-09-08

## 2025-08-18 RX ORDER — MELOXICAM 15 MG/1
15 TABLET ORAL DAILY
Qty: 14 TABLET | Refills: 0 | Status: SHIPPED | OUTPATIENT
Start: 2025-08-18 | End: 2025-09-01

## 2025-08-18 ASSESSMENT — PAIN SCALES - GENERAL: PAINLEVEL_OUTOF10: NO PAIN (0)
